# Patient Record
Sex: MALE | Race: OTHER | HISPANIC OR LATINO
[De-identification: names, ages, dates, MRNs, and addresses within clinical notes are randomized per-mention and may not be internally consistent; named-entity substitution may affect disease eponyms.]

---

## 2020-01-17 ENCOUNTER — APPOINTMENT (OUTPATIENT)
Dept: FAMILY MEDICINE | Facility: CLINIC | Age: 40
End: 2020-01-17
Payer: COMMERCIAL

## 2020-01-17 VITALS
HEIGHT: 70 IN | DIASTOLIC BLOOD PRESSURE: 80 MMHG | OXYGEN SATURATION: 97 % | BODY MASS INDEX: 28.35 KG/M2 | RESPIRATION RATE: 14 BRPM | TEMPERATURE: 98.3 F | WEIGHT: 198 LBS | SYSTOLIC BLOOD PRESSURE: 118 MMHG | HEART RATE: 85 BPM

## 2020-01-17 DIAGNOSIS — J45.909 UNSPECIFIED ASTHMA, UNCOMPLICATED: ICD-10-CM

## 2020-01-17 DIAGNOSIS — Z83.3 FAMILY HISTORY OF DIABETES MELLITUS: ICD-10-CM

## 2020-01-17 DIAGNOSIS — Z80.0 FAMILY HISTORY OF MALIGNANT NEOPLASM OF DIGESTIVE ORGANS: ICD-10-CM

## 2020-01-17 DIAGNOSIS — K64.4 RESIDUAL HEMORRHOIDAL SKIN TAGS: ICD-10-CM

## 2020-01-17 DIAGNOSIS — Z82.49 FAMILY HISTORY OF ISCHEMIC HEART DISEASE AND OTHER DISEASES OF THE CIRCULATORY SYSTEM: ICD-10-CM

## 2020-01-17 DIAGNOSIS — Z87.891 PERSONAL HISTORY OF NICOTINE DEPENDENCE: ICD-10-CM

## 2020-01-17 DIAGNOSIS — Z76.89 PERSONS ENCOUNTERING HEALTH SERVICES IN OTHER SPECIFIED CIRCUMSTANCES: ICD-10-CM

## 2020-01-17 DIAGNOSIS — Z78.9 OTHER SPECIFIED HEALTH STATUS: ICD-10-CM

## 2020-01-17 DIAGNOSIS — J30.2 OTHER SEASONAL ALLERGIC RHINITIS: ICD-10-CM

## 2020-01-17 DIAGNOSIS — Z00.00 ENCOUNTER FOR GENERAL ADULT MEDICAL EXAMINATION W/OUT ABNORMAL FINDINGS: ICD-10-CM

## 2020-01-17 DIAGNOSIS — R06.2 WHEEZING: ICD-10-CM

## 2020-01-17 DIAGNOSIS — E34.9 ENDOCRINE DISORDER, UNSPECIFIED: ICD-10-CM

## 2020-01-17 PROCEDURE — 99205 OFFICE O/P NEW HI 60 MIN: CPT

## 2020-01-17 NOTE — HISTORY OF PRESENT ILLNESS
[FreeTextEntry1] : Patient presents to establish new care\par  [de-identified] : c/o b/l knee pain x 3-4 weeks\par R> L\par he takes naproxen at times for pain and he is eating raw tumeric and has some relief when he takes it daily\par c/o  b/l knee   pain, stiffness,   4  sometimes 6-7/10 intermittent but constant mild  , alleviated by naproxen and tumeric and warm compresses  , aggravated by  moving and not sitting down\par \par denies hx ulcers \par \par he saw a rheumatologist 1.5yrs ago and he had blood work done \par he had testosterone injections at that time \par was told to stop marijauana at that time as it could be causing some inflammation and he stopped for a while and restarted again\par he has a hx of using iron infusions as well \par he is not sure who rheumatologist was but he thinks affiliated with [facility] \par \par c/o wheezing denies sob or coughing \par he has a hx of asthma\par

## 2020-01-17 NOTE — HEALTH RISK ASSESSMENT
[Patient reported colonoscopy was normal] : Patient reported colonoscopy was normal [HIV test declined] : HIV test declined [Hepatitis C test declined] : Hepatitis C test declined [Fully functional (bathing, dressing, toileting, transferring, walking, feeding)] : Fully functional (bathing, dressing, toileting, transferring, walking, feeding) [Fully functional (using the telephone, shopping, preparing meals, housekeeping, doing laundry, using] : Fully functional and needs no help or supervision to perform IADLs (using the telephone, shopping, preparing meals, housekeeping, doing laundry, using transportation, managing medications and managing finances) [Smoke Detector] : smoke detector [Carbon Monoxide Detector] : carbon monoxide detector [Seat Belt] :  uses seat belt [Reports changes in hearing] : Reports no changes in hearing [Reports changes in vision] : Reports no changes in vision [Reports changes in dental health] : Reports no changes in dental health [ColonoscopyDate] : 2014 [ColonoscopyComments] : Dr Lang normal except internal hemorrhoid

## 2020-01-17 NOTE — PLAN
[FreeTextEntry1] : b/l knee pain r/o autoimmune as he has lichen planus vs OA vs lymes \par xrays b/l\par naproxen q12hrs prn\par labs to be done fasting\par ortho consult\par rheum consult\par \par \par wheezing at times hx asthma\par pulm for PFTS\par cxr to ensure no other etiology\par albuterol sent to pharmacy\par \par hx lichen planus\par doesn’t want to take po prednisone \par trial clobetasol bid prn\par derm consult\par \par \par anxiety intermittent\par will think about starting ssri to avoid taking xanax as he doesn’t like taking it and doesn’t take it much he says\par labs to be drawn fasting\par advised to stop marijuana as i think it can worsen anxiety he said it doesn’t affect anxiety but can stop at any time\par \par hx iron def\par iron studies, cbc, b12\par \par fu 2-3 weeks pt agreed w/plan

## 2020-01-17 NOTE — REVIEW OF SYSTEMS
[Wheezing] : wheezing [Joint Pain] : joint pain [Skin Rash] : skin rash [Fever] : no fever [Chills] : no chills [Palpitations] : no palpitations [Chest Pain] : no chest pain [FreeTextEntry9] : b/l knee pain [Shortness Of Breath] : no shortness of breath

## 2020-01-17 NOTE — PHYSICAL EXAM
[No Lymphadenopathy] : no lymphadenopathy [Supple] : supple [Normal] : normal rate, regular rhythm, normal S1 and S2 and no murmur heard [No Edema] : there was no peripheral edema [No Focal Deficits] : no focal deficits [Normal Affect] : the affect was normal [Normal Mood] : the mood was normal [Normal Insight/Judgement] : insight and judgment were intact [de-identified] : knees full rom but mild discomfort/stiffness with flexion , +crepitus b/l , no edema or erythema or warmth b/l [de-identified] : multiple purplish papules noted on legs and back looks like old scarring, and small papules with erythema noted on chest x 2 papules

## 2020-01-21 ENCOUNTER — APPOINTMENT (OUTPATIENT)
Dept: FAMILY MEDICINE | Facility: CLINIC | Age: 40
End: 2020-01-21

## 2020-01-22 LAB
ALBUMIN SERPL ELPH-MCNC: 4.6 G/DL
ALP BLD-CCNC: 64 U/L
ALT SERPL-CCNC: 24 U/L
ANION GAP SERPL CALC-SCNC: 12 MMOL/L
AST SERPL-CCNC: 25 U/L
B BURGDOR AB SER-IMP: NEGATIVE
B BURGDOR IGG+IGM SER QL: 0.12 INDEX
BASOPHILS # BLD AUTO: 0.02 K/UL
BASOPHILS NFR BLD AUTO: 0.5 %
BILIRUB SERPL-MCNC: 0.3 MG/DL
BUN SERPL-MCNC: 12 MG/DL
CALCIUM SERPL-MCNC: 9.8 MG/DL
CHLORIDE SERPL-SCNC: 100 MMOL/L
CHOLEST SERPL-MCNC: 206 MG/DL
CHOLEST/HDLC SERPL: 5.5 RATIO
CO2 SERPL-SCNC: 25 MMOL/L
CREAT SERPL-MCNC: 0.84 MG/DL
CRP SERPL-MCNC: <0.1 MG/DL
EOSINOPHIL # BLD AUTO: 0.08 K/UL
EOSINOPHIL NFR BLD AUTO: 1.9 %
ERYTHROCYTE [SEDIMENTATION RATE] IN BLOOD BY WESTERGREN METHOD: 4 MM/HR
ESTIMATED AVERAGE GLUCOSE: 120 MG/DL
FERRITIN SERPL-MCNC: 30 NG/ML
FOLATE SERPL-MCNC: 19.1 NG/ML
GLUCOSE SERPL-MCNC: 93 MG/DL
HBA1C MFR BLD HPLC: 5.8 %
HCT VFR BLD CALC: 45 %
HDLC SERPL-MCNC: 38 MG/DL
HGB BLD-MCNC: 14.7 G/DL
IMM GRANULOCYTES NFR BLD AUTO: 0.2 %
IRON SATN MFR SERPL: 16 %
IRON SERPL-MCNC: 62 UG/DL
LDLC SERPL CALC-MCNC: 125 MG/DL
LYMPHOCYTES # BLD AUTO: 1.6 K/UL
LYMPHOCYTES NFR BLD AUTO: 37.1 %
MAN DIFF?: NORMAL
MCHC RBC-ENTMCNC: 28.8 PG
MCHC RBC-ENTMCNC: 32.7 GM/DL
MCV RBC AUTO: 88.1 FL
MONOCYTES # BLD AUTO: 0.35 K/UL
MONOCYTES NFR BLD AUTO: 8.1 %
NEUTROPHILS # BLD AUTO: 2.25 K/UL
NEUTROPHILS NFR BLD AUTO: 52.2 %
PLATELET # BLD AUTO: 333 K/UL
POTASSIUM SERPL-SCNC: 5 MMOL/L
PROT SERPL-MCNC: 7.4 G/DL
RBC # BLD: 5.11 M/UL
RBC # FLD: 14.4 %
RHEUMATOID FACT SER QL: <10 IU/ML
SODIUM SERPL-SCNC: 137 MMOL/L
TIBC SERPL-MCNC: 378 UG/DL
TRIGL SERPL-MCNC: 218 MG/DL
TSH SERPL-ACNC: 3.74 UIU/ML
UIBC SERPL-MCNC: 316 UG/DL
VIT B12 SERPL-MCNC: 677 PG/ML
WBC # FLD AUTO: 4.31 K/UL

## 2020-01-24 LAB
B BURGDOR AB SER-IMP: NEGATIVE
B BURGDOR IGM PATRN SER IB-IMP: NEGATIVE
B BURGDOR18KD IGG SER QL IB: NORMAL
B BURGDOR23KD IGG SER QL IB: NORMAL
B BURGDOR23KD IGM SER QL IB: NORMAL
B BURGDOR28KD IGG SER QL IB: NORMAL
B BURGDOR30KD IGG SER QL IB: NORMAL
B BURGDOR31KD IGG SER QL IB: NORMAL
B BURGDOR39KD IGG SER QL IB: NORMAL
B BURGDOR39KD IGM SER QL IB: NORMAL
B BURGDOR41KD IGG SER QL IB: NORMAL
B BURGDOR41KD IGM SER QL IB: PRESENT
B BURGDOR45KD IGG SER QL IB: NORMAL
B BURGDOR58KD IGG SER QL IB: NORMAL
B BURGDOR66KD IGG SER QL IB: NORMAL
B BURGDOR93KD IGG SER QL IB: NORMAL
CCP AB SER IA-ACNC: 17 UNITS
DSDNA AB SER-ACNC: <12 IU/ML
ENA SCL70 IGG SER IA-ACNC: <0.2 AL
RF+CCP IGG SER-IMP: NEGATIVE
TESTOST BND SERPL-MCNC: 9.9 PG/ML
TESTOST SERPL-MCNC: 556.5 NG/DL

## 2020-01-30 LAB — ANA SER IF-ACNC: NEGATIVE

## 2020-02-05 ENCOUNTER — APPOINTMENT (OUTPATIENT)
Dept: FAMILY MEDICINE | Facility: CLINIC | Age: 40
End: 2020-02-05
Payer: COMMERCIAL

## 2020-02-05 ENCOUNTER — RESULT CHARGE (OUTPATIENT)
Age: 40
End: 2020-02-05

## 2020-02-05 VITALS
TEMPERATURE: 98.3 F | HEIGHT: 70 IN | BODY MASS INDEX: 28.35 KG/M2 | HEART RATE: 73 BPM | RESPIRATION RATE: 14 BRPM | SYSTOLIC BLOOD PRESSURE: 120 MMHG | OXYGEN SATURATION: 98 % | DIASTOLIC BLOOD PRESSURE: 80 MMHG | WEIGHT: 198 LBS

## 2020-02-05 LAB — S PYO AG SPEC QL IA: NEGATIVE

## 2020-02-05 PROCEDURE — 87880 STREP A ASSAY W/OPTIC: CPT | Mod: QW

## 2020-02-05 PROCEDURE — 99214 OFFICE O/P EST MOD 30 MIN: CPT | Mod: 25

## 2020-02-05 RX ORDER — ALPRAZOLAM 1 MG/1
1 TABLET ORAL DAILY
Refills: 0 | Status: DISCONTINUED | COMMUNITY
Start: 2020-01-17 | End: 2020-02-05

## 2020-02-05 NOTE — HISTORY OF PRESENT ILLNESS
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

## 2020-02-05 NOTE — HISTORY OF PRESENT ILLNESS
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

## 2020-02-05 NOTE — REVIEW OF SYSTEMS
[Sore Throat] : sore throat [Joint Pain] : joint pain [Chills] : no chills [Fever] : no fever [Chest Pain] : no chest pain [Shortness Of Breath] : no shortness of breath [FreeTextEntry9] : knee pain b/l

## 2020-02-05 NOTE — PHYSICAL EXAM
[Supple] : supple [No Lymphadenopathy] : no lymphadenopathy [Normal] : normal rate, regular rhythm, normal S1 and S2 and no murmur heard [No Edema] : there was no peripheral edema [No Focal Deficits] : no focal deficits [Normal Mood] : the mood was normal [Normal Affect] : the affect was normal [Normal Insight/Judgement] : insight and judgment were intact [de-identified] : knees full rom without pain, mild crepitus b/l, mild pain with lateral rotation of right knee no edema or erythema or warmth, mild discomfort on palpation of b/l popliteal fossa R>L and right looks alcantara compared to left

## 2020-02-05 NOTE — PLAN
[FreeTextEntry1] : b/l knee pain r/o autoimmune as he has lichen planus vs OA vs lymes \par xrays b/l wnl\par US r/o popliteal cysts\par seeing ortho monday \par naproxen q12hrs prn\par rheum consult\par \par wheezing at times hx asthma\par pulm for PFTS\par cxr to ensure no other etiology wnl\par albuterol sent to pharmacy last visit \par \par hx lichen planus controlled for now \par doesn’t want to take po prednisone \par trial clobetasol bid prn- found relief \par derm consult\par \par \par anxiety intermittent\par he stopped marijuana\par refilled xanax daily prn\par doesn’t want to start ssri right now\par Reference #: [id number]  istopped \par \par hx iron def\par iron studies, cbc, b12 wnl \par \par Acute pharyngitis \par -Rapid strep neg\par -Throat cx sent\par -Hot tea w/ honey, warm salt water gargles, increase fluids\par \par \par f/u 1 month after seeing specialists  pt agreed w/plan

## 2020-02-05 NOTE — PLAN
[FreeTextEntry1] : b/l knee pain r/o autoimmune as he has lichen planus vs OA vs lymes \par xrays b/l wnl\par US r/o popliteal cysts\par seeing ortho monday \par naproxen q12hrs prn\par rheum consult\par \par wheezing at times hx asthma\par pulm for PFTS\par cxr to ensure no other etiology wnl\par albuterol sent to pharmacy last visit \par \par hx lichen planus controlled for now \par doesn’t want to take po prednisone \par trial clobetasol bid prn- found relief \par derm consult\par \par \par anxiety intermittent\par he stopped marijuana\par refilled xanax daily prn\par doesn’t want to start ssri right now\par Reference #: [id number]  istopped \par \par hx iron def\par iron studies, cbc, b12 wnl \par \par Acute pharyngitis \par -Rapid strep neg\par -Throat cx sent\par -Hot tea w/ honey, warm salt water gargles, increase fluids\par \par \par f/u 1 month after seeing specialists  pt agreed w/plan

## 2020-02-05 NOTE — PHYSICAL EXAM
[No Lymphadenopathy] : no lymphadenopathy [Supple] : supple [No Edema] : there was no peripheral edema [Normal] : normal rate, regular rhythm, normal S1 and S2 and no murmur heard [No Focal Deficits] : no focal deficits [Normal Affect] : the affect was normal [Normal Mood] : the mood was normal [Normal Insight/Judgement] : insight and judgment were intact [de-identified] : knees full rom without pain, mild crepitus b/l, mild pain with lateral rotation of right knee no edema or erythema or warmth, mild discomfort on palpation of b/l popliteal fossa R>L and right looks alcantara compared to left

## 2020-02-08 LAB — BACTERIA THROAT CULT: NORMAL

## 2020-04-30 RX ORDER — ALPRAZOLAM 0.5 MG/1
0.5 TABLET ORAL
Qty: 30 | Refills: 0 | Status: DISCONTINUED | COMMUNITY
Start: 2020-02-05 | End: 2020-04-30

## 2020-05-01 ENCOUNTER — APPOINTMENT (OUTPATIENT)
Dept: FAMILY MEDICINE | Facility: CLINIC | Age: 40
End: 2020-05-01
Payer: COMMERCIAL

## 2020-05-01 VITALS — HEIGHT: 70 IN | WEIGHT: 195 LBS | HEART RATE: 84 BPM | BODY MASS INDEX: 27.92 KG/M2

## 2020-05-01 PROCEDURE — 99212 OFFICE O/P EST SF 10 MIN: CPT | Mod: 95

## 2020-05-01 NOTE — PHYSICAL EXAM
[Normal Sclera/Conjunctiva] : normal sclera/conjunctiva [Normal Outer Ear/Nose] : the outer ears and nose were normal in appearance [Normal Oropharynx] : the oropharynx was normal [No Acute Distress] : no acute distress [Well-Appearing] : well-appearing [Well Nourished] : well nourished [Well Developed] : well developed [EOMI] : extraocular movements intact [No Focal Deficits] : no focal deficits [Coordination Grossly Intact] : coordination grossly intact [Normal Affect] : the affect was normal [Normal Mood] : the mood was normal [Normal Insight/Judgement] : insight and judgment were intact [de-identified] : no swelling in neck as per patient palpating [de-identified] : normal chest rise with inhalation and chest fall with exhalation \par \par

## 2020-05-01 NOTE — PLAN
[FreeTextEntry1] : \par anxiety seems controlled\par refilled xanax uses sparingly - 1month prescription lasted him almost 3 mos\par [facility] Reference #: [id number]\par \par lichen planus- controlled for most part\par refill clobetasol\par \par b/l knee pain\par advised to return to PT once covid crisis is resolved\par for now he has been doing knee stretching at home\par \par f/u as needed pt agreed w/plan

## 2020-05-01 NOTE — HISTORY OF PRESENT ILLNESS
[Other Location: e.g. School (Enter Location, City,State)___] : at [unfilled], at the time of the visit. [Medical Office: ([facility])___] : at the medical office located in  [Patient] : the patient [FreeTextEntry8] : 40yo male presents for follow up for anxiety and lichen planus\par \par he is feeling well overall\par \par he said he slightly  stressed with his job but nothing more than usual, he said he has not had to take xanax everyday\par he is still having pain in his knees but it comes and goes\par he hasn't smoked marijuana and he felt better\par he was doing PT and felt better but had to stop bc of covid19\par he was doing knee stretching at home when PT was cancelled due to covid\par \par

## 2020-06-03 ENCOUNTER — APPOINTMENT (OUTPATIENT)
Dept: FAMILY MEDICINE | Facility: CLINIC | Age: 40
End: 2020-06-03
Payer: COMMERCIAL

## 2020-06-03 PROCEDURE — 99441: CPT

## 2020-06-22 ENCOUNTER — RX RENEWAL (OUTPATIENT)
Age: 40
End: 2020-06-22

## 2020-12-02 ENCOUNTER — APPOINTMENT (OUTPATIENT)
Dept: FAMILY MEDICINE | Facility: CLINIC | Age: 40
End: 2020-12-02
Payer: COMMERCIAL

## 2020-12-02 VITALS
RESPIRATION RATE: 14 BRPM | HEART RATE: 71 BPM | DIASTOLIC BLOOD PRESSURE: 80 MMHG | TEMPERATURE: 98.4 F | WEIGHT: 190 LBS | SYSTOLIC BLOOD PRESSURE: 112 MMHG | BODY MASS INDEX: 27.2 KG/M2 | OXYGEN SATURATION: 98 % | HEIGHT: 70 IN

## 2020-12-02 DIAGNOSIS — Z87.09 PERSONAL HISTORY OF OTHER DISEASES OF THE RESPIRATORY SYSTEM: ICD-10-CM

## 2020-12-02 PROCEDURE — 99213 OFFICE O/P EST LOW 20 MIN: CPT

## 2020-12-02 PROCEDURE — 99072 ADDL SUPL MATRL&STAF TM PHE: CPT

## 2020-12-02 RX ORDER — PREDNISONE 5 MG/1
5 TABLET ORAL EVERY OTHER DAY
Refills: 0 | Status: DISCONTINUED | COMMUNITY
Start: 2020-01-17 | End: 2020-12-02

## 2020-12-02 NOTE — PLAN
[FreeTextEntry1] : \par anxiety intermittent\par contract signed\par urine drug screen today as not on file \par Reference #: [id number]\par refilled xanax but will try hydroxyzine instead and see if it helps\par \par lichen planus\par prednisone taper w/ food\par avoid nsaids\par \par f/u as needed pt agreed w/plan

## 2020-12-02 NOTE — HISTORY OF PRESENT ILLNESS
[FreeTextEntry1] : Patient present for medication renewal\par  [de-identified] : Patient present for medication renewal for lichen planus and anxiety \par he wants to restart a round of prednisone due to a lichen planus flare on his legs b/l \par he said he has itching and he sometimes uses clobetasol cream but it doesn’t do much \par \par he hasn’t had to use prednisone for lichen planus since may\par \par he is using clobetasol once week\par \par he doesn’t take xanax much\par last refill was in may 2020\par he said he only takes it if he feels anxious which isnt often\par he is open to trying something new\par

## 2020-12-02 NOTE — REVIEW OF SYSTEMS
[Fever] : no fever [Chills] : no chills [Chest Pain] : no chest pain [Shortness Of Breath] : no shortness of breath [Cough] : no cough [Itching] : itching [Skin Rash] : skin rash

## 2020-12-02 NOTE — PHYSICAL EXAM
[No Lymphadenopathy] : no lymphadenopathy [Supple] : supple [Normal] : normal rate, regular rhythm, normal S1 and S2 and no murmur heard [No Edema] : there was no peripheral edema [No Focal Deficits] : no focal deficits [Normal Affect] : the affect was normal [Normal Mood] : the mood was normal [Normal Insight/Judgement] : insight and judgment were intact [de-identified] : erythematous small papules noted on legs with some scarring, also on back w/small papules and scarring

## 2020-12-19 LAB — COMPREHENSIVE SCREEN URINE: NORMAL

## 2020-12-23 ENCOUNTER — RX RENEWAL (OUTPATIENT)
Age: 40
End: 2020-12-23

## 2020-12-29 ENCOUNTER — NON-APPOINTMENT (OUTPATIENT)
Age: 40
End: 2020-12-29

## 2021-07-20 ENCOUNTER — APPOINTMENT (OUTPATIENT)
Dept: FAMILY MEDICINE | Facility: CLINIC | Age: 41
End: 2021-07-20
Payer: COMMERCIAL

## 2021-07-20 VITALS
OXYGEN SATURATION: 98 % | HEIGHT: 70 IN | DIASTOLIC BLOOD PRESSURE: 80 MMHG | BODY MASS INDEX: 25.77 KG/M2 | WEIGHT: 180 LBS | HEART RATE: 72 BPM | SYSTOLIC BLOOD PRESSURE: 112 MMHG | RESPIRATION RATE: 14 BRPM

## 2021-07-20 VITALS — TEMPERATURE: 97.9 F

## 2021-07-20 DIAGNOSIS — D50.9 IRON DEFICIENCY ANEMIA, UNSPECIFIED: ICD-10-CM

## 2021-07-20 DIAGNOSIS — Z13.29 ENCOUNTER FOR SCREENING FOR OTHER SUSPECTED ENDOCRINE DISORDER: ICD-10-CM

## 2021-07-20 DIAGNOSIS — Z13.0 ENCOUNTER FOR SCREENING FOR DISEASES OF THE BLOOD AND BLOOD-FORMING ORGANS AND CERTAIN DISORDERS INVOLVING THE IMMUNE MECHANISM: ICD-10-CM

## 2021-07-20 DIAGNOSIS — Z13.220 ENCOUNTER FOR SCREENING FOR LIPOID DISORDERS: ICD-10-CM

## 2021-07-20 DIAGNOSIS — Z12.5 ENCOUNTER FOR SCREENING FOR MALIGNANT NEOPLASM OF PROSTATE: ICD-10-CM

## 2021-07-20 PROCEDURE — G0442 ANNUAL ALCOHOL SCREEN 15 MIN: CPT

## 2021-07-20 PROCEDURE — G0444 DEPRESSION SCREEN ANNUAL: CPT | Mod: 59

## 2021-07-20 PROCEDURE — 99213 OFFICE O/P EST LOW 20 MIN: CPT | Mod: 25

## 2021-07-20 RX ORDER — HYDROXYZINE PAMOATE 25 MG/1
25 CAPSULE ORAL
Qty: 60 | Refills: 2 | Status: DISCONTINUED | COMMUNITY
Start: 2020-12-02 | End: 2021-07-20

## 2021-07-20 NOTE — PLAN
[FreeTextEntry1] : 40 yr old male is here for follow up\par \par continue current medications as directed \par pt does not wish to see rheum at this time. \par \par \par Routine lab work today to screen for anemia, CAD, liver and kidney abnormalities, and thyroid disorders (CBC, CMP, lipid, TSH and PSA) \par \par \par I-STOP: #: [id number] \par Patient advised harmful side effects and risk of dependence with long term use of benzodiazepines.\par Advised caution related to sedative effects and possible respiratory depression. Advised not to take medication while driving  or in combination with alcohol.\par \par continue all medications as directed \par RTO as routine for follow up.\par \par

## 2021-07-20 NOTE — PLAN
[FreeTextEntry1] : 40 yr old male is here for follow up\par \par continue current medications as directed \par pt does not wish to see rheum at this time. \par \par \par Routine lab work today to screen for anemia, CAD, liver and kidney abnormalities, and thyroid disorders (CBC, CMP, lipid, TSH and PSA) \par \par \par I-STOP: #: [id number] \par Patient advised harmful side effects and risk of dependence with long term use of benzodiazepines.\par Advised caution related to sedative effects and possible respiratory depression. Advised not to take medication while driving  or in combination with alcohol.\par \par continue all medications as directed \par RTO as routine for follow up.\par \par

## 2021-07-20 NOTE — END OF VISIT
[FreeTextEntry3] : 20 minutes was spent with patient. Extensive discussion regarding medical compliance with medications, healthy lifestyle and importance of behavioral health.\par  [Time Spent: ___ minutes] : I have spent [unfilled] minutes of time on the encounter.

## 2021-07-20 NOTE — PHYSICAL EXAM
[No Acute Distress] : no acute distress [Normal Sclera/Conjunctiva] : normal sclera/conjunctiva [Normal Outer Ear/Nose] : the outer ears and nose were normal in appearance [No JVD] : no jugular venous distention [No Respiratory Distress] : no respiratory distress  [No Accessory Muscle Use] : no accessory muscle use [Clear to Auscultation] : lungs were clear to auscultation bilaterally [Normal Rate] : normal rate  [Regular Rhythm] : with a regular rhythm [Normal S1, S2] : normal S1 and S2 [No Murmur] : no murmur heard [No Extremity Clubbing/Cyanosis] : no extremity clubbing/cyanosis [Normal Gait] : normal gait [Normal Affect] : the affect was normal [de-identified] : rash on b/l shins

## 2021-07-20 NOTE — PHYSICAL EXAM
[No Acute Distress] : no acute distress [Normal Sclera/Conjunctiva] : normal sclera/conjunctiva [Normal Outer Ear/Nose] : the outer ears and nose were normal in appearance [No JVD] : no jugular venous distention [No Respiratory Distress] : no respiratory distress  [No Accessory Muscle Use] : no accessory muscle use [Clear to Auscultation] : lungs were clear to auscultation bilaterally [Normal Rate] : normal rate  [Regular Rhythm] : with a regular rhythm [Normal S1, S2] : normal S1 and S2 [No Murmur] : no murmur heard [No Extremity Clubbing/Cyanosis] : no extremity clubbing/cyanosis [Normal Gait] : normal gait [Normal Affect] : the affect was normal [de-identified] : rash on b/l shins

## 2021-07-21 ENCOUNTER — APPOINTMENT (OUTPATIENT)
Dept: FAMILY MEDICINE | Facility: CLINIC | Age: 41
End: 2021-07-21
Payer: COMMERCIAL

## 2021-07-21 VITALS — TEMPERATURE: 97.1 F

## 2021-07-21 PROCEDURE — 36415 COLL VENOUS BLD VENIPUNCTURE: CPT

## 2021-07-22 LAB
ALBUMIN SERPL ELPH-MCNC: 4.6 G/DL
ALP BLD-CCNC: 80 U/L
ALT SERPL-CCNC: 19 U/L
ANION GAP SERPL CALC-SCNC: 10 MMOL/L
AST SERPL-CCNC: 22 U/L
BASOPHILS # BLD AUTO: 0.02 K/UL
BASOPHILS NFR BLD AUTO: 0.4 %
BILIRUB SERPL-MCNC: 0.3 MG/DL
BUN SERPL-MCNC: 15 MG/DL
CALCIUM SERPL-MCNC: 9.9 MG/DL
CHLORIDE SERPL-SCNC: 102 MMOL/L
CHOLEST SERPL-MCNC: 206 MG/DL
CO2 SERPL-SCNC: 27 MMOL/L
CREAT SERPL-MCNC: 0.84 MG/DL
EOSINOPHIL # BLD AUTO: 0.13 K/UL
EOSINOPHIL NFR BLD AUTO: 2.6 %
FERRITIN SERPL-MCNC: 45 NG/ML
GLUCOSE SERPL-MCNC: 103 MG/DL
HCT VFR BLD CALC: 45.1 %
HDLC SERPL-MCNC: 47 MG/DL
HGB BLD-MCNC: 14.3 G/DL
IMM GRANULOCYTES NFR BLD AUTO: 0.6 %
IRON SATN MFR SERPL: 14 %
IRON SERPL-MCNC: 54 UG/DL
LDLC SERPL CALC-MCNC: 137 MG/DL
LYMPHOCYTES # BLD AUTO: 1.7 K/UL
LYMPHOCYTES NFR BLD AUTO: 33.5 %
MAN DIFF?: NORMAL
MCHC RBC-ENTMCNC: 29.4 PG
MCHC RBC-ENTMCNC: 31.7 GM/DL
MCV RBC AUTO: 92.6 FL
MONOCYTES # BLD AUTO: 0.38 K/UL
MONOCYTES NFR BLD AUTO: 7.5 %
NEUTROPHILS # BLD AUTO: 2.82 K/UL
NEUTROPHILS NFR BLD AUTO: 55.4 %
NONHDLC SERPL-MCNC: 160 MG/DL
PLATELET # BLD AUTO: 359 K/UL
POTASSIUM SERPL-SCNC: 5.2 MMOL/L
PROT SERPL-MCNC: 7 G/DL
PSA SERPL-MCNC: 0.84 NG/ML
RBC # BLD: 4.87 M/UL
RBC # BLD: 4.87 M/UL
RBC # FLD: 14.2 %
RETICS # AUTO: 1.3 %
RETICS AGGREG/RBC NFR: 64.3 K/UL
SODIUM SERPL-SCNC: 138 MMOL/L
T3FREE SERPL-MCNC: 3.32 PG/ML
T4 FREE SERPL-MCNC: 1.3 NG/DL
TIBC SERPL-MCNC: 377 UG/DL
TRANSFERRIN SERPL-MCNC: 311 MG/DL
TRIGL SERPL-MCNC: 112 MG/DL
TSH SERPL-ACNC: 1.97 UIU/ML
UIBC SERPL-MCNC: 324 UG/DL
WBC # FLD AUTO: 5.08 K/UL

## 2021-07-23 LAB
TESTOST BND SERPL-MCNC: 8.5 PG/ML
TESTOSTERONE TOTAL S: 556 NG/DL

## 2021-07-26 NOTE — HISTORY OF PRESENT ILLNESS
[de-identified] : 40 yr old male is here today for physical examination. He reports taking prednisone due to autoimmune flare ups due to lichen planus. Admits to abdominal discomfort from food. Denies changes in vision, dizziness, chest pain, palpitations and lower extremity edema.\par  [FreeTextEntry1] : /med\par

## 2021-07-26 NOTE — HISTORY OF PRESENT ILLNESS
[de-identified] : 40 yr old male is here today for physical examination. He reports taking prednisone due to autoimmune flare ups due to lichen planus. Admits to abdominal discomfort from food. Denies changes in vision, dizziness, chest pain, palpitations and lower extremity edema.\par  [FreeTextEntry1] : /med\par

## 2021-08-07 ENCOUNTER — RX RENEWAL (OUTPATIENT)
Age: 41
End: 2021-08-07

## 2022-01-24 ENCOUNTER — APPOINTMENT (OUTPATIENT)
Dept: FAMILY MEDICINE | Facility: CLINIC | Age: 42
End: 2022-01-24
Payer: COMMERCIAL

## 2022-01-24 DIAGNOSIS — B96.89 ACUTE SINUSITIS, UNSPECIFIED: ICD-10-CM

## 2022-01-24 DIAGNOSIS — H92.01 OTALGIA, RIGHT EAR: ICD-10-CM

## 2022-01-24 DIAGNOSIS — J01.90 ACUTE SINUSITIS, UNSPECIFIED: ICD-10-CM

## 2022-01-24 PROCEDURE — 99442: CPT

## 2022-01-24 NOTE — REVIEW OF SYSTEMS
[Earache] : earache [Postnasal Drip] : postnasal drip [Nasal Discharge] : nasal discharge [Negative] : Neurological

## 2022-01-24 NOTE — ASSESSMENT
[FreeTextEntry1] : Acute bacterial Sinusitis with Rt Ear Pain\par unable to rule out otitis\par will over for otitis and bacterial sinusitis given hx of double sickening \par amoxicillin 875 bid x 10 days \par advised take with probiotic to prevent diarrhea \par follow up in person if symptoms fail to improve\par \par Lichen Planus\par renewed his prednisone for acute flair that pt says he seems to be developing\par has tolerated this in the past\par patients only during flair\par \par f/u with his PCP as needed or if symptoms fail to improve \par Patient agrees with plan, all further questions answered during encounter.\par

## 2022-01-24 NOTE — HISTORY OF PRESENT ILLNESS
[Home] : at home, [unfilled] , at the time of the visit. [Medical Office: ([facility])___] : at the medical office located in  [Verbal consent obtained from patient] : the patient, [unfilled] [FreeTextEntry8] : 40 yo male, hx of lichen planus presenting for ear pain/sinus symptoms. \par Says his son was given amoxicillin for nasal infection recently. \par patient's Tonsils have white discharge. \par +sore throat. \par Also with right ear soreness. \par Feels sharp pain when swallowing pain.\par Has been occurring for 2 days. \par +nasal congestion. \par +PND\par No fevers or chills. \par No dyspnea, or chest pain. \par Had been having mild uri symptoms for 2 weeks prior to onset of worsening ear pain, sore throat. \par +double sickening\par Tried some tylenol that did not really help much. \par Also requests prednisone refill for recurrent skin rash over lower leg diagnosed previously with lichen planus.

## 2022-01-25 ENCOUNTER — APPOINTMENT (OUTPATIENT)
Dept: FAMILY MEDICINE | Facility: CLINIC | Age: 42
End: 2022-01-25

## 2022-02-16 ENCOUNTER — NON-APPOINTMENT (OUTPATIENT)
Age: 42
End: 2022-02-16

## 2022-10-18 ENCOUNTER — NON-APPOINTMENT (OUTPATIENT)
Age: 42
End: 2022-10-18

## 2022-10-18 ENCOUNTER — APPOINTMENT (OUTPATIENT)
Dept: FAMILY MEDICINE | Facility: CLINIC | Age: 42
End: 2022-10-18

## 2022-10-18 PROCEDURE — 99443: CPT

## 2022-10-18 RX ORDER — CLOBETASOL PROPIONATE 0.5 MG/G
0.05 CREAM TOPICAL TWICE DAILY
Qty: 1 | Refills: 5 | Status: ACTIVE | COMMUNITY
Start: 2020-01-17 | End: 1900-01-01

## 2022-10-18 RX ORDER — METHYLPREDNISOLONE 4 MG/1
4 TABLET ORAL
Qty: 21 | Refills: 0 | Status: DISCONTINUED | COMMUNITY
Start: 2022-04-24

## 2022-10-18 RX ORDER — IBUPROFEN 800 MG/1
800 TABLET, FILM COATED ORAL
Qty: 21 | Refills: 0 | Status: ACTIVE | COMMUNITY
Start: 2022-04-19

## 2022-10-18 RX ORDER — ALBUTEROL SULFATE 90 UG/1
108 (90 BASE) INHALANT RESPIRATORY (INHALATION)
Qty: 1 | Refills: 5 | Status: DISCONTINUED | COMMUNITY
Start: 2020-01-17 | End: 2022-10-18

## 2022-10-18 RX ORDER — CLINDAMYCIN HYDROCHLORIDE 150 MG/1
150 CAPSULE ORAL
Qty: 28 | Refills: 0 | Status: DISCONTINUED | COMMUNITY
Start: 2022-04-19

## 2022-10-18 RX ORDER — CHLORHEXIDINE GLUCONATE, 0.12% ORAL RINSE 1.2 MG/ML
0.12 SOLUTION DENTAL
Qty: 473 | Refills: 0 | Status: DISCONTINUED | COMMUNITY
Start: 2022-04-19

## 2022-10-18 RX ORDER — AMOXICILLIN 875 MG/1
875 TABLET, FILM COATED ORAL
Qty: 20 | Refills: 0 | Status: DISCONTINUED | COMMUNITY
Start: 2022-01-24 | End: 2022-10-18

## 2022-10-18 RX ORDER — AMPICILLIN TRIHYDRATE 500 MG
25 MCG CAPSULE ORAL
Qty: 30 | Refills: 0 | Status: ACTIVE | COMMUNITY
Start: 2022-06-28

## 2022-10-26 ENCOUNTER — APPOINTMENT (OUTPATIENT)
Dept: FAMILY MEDICINE | Facility: CLINIC | Age: 42
End: 2022-10-26

## 2022-10-26 DIAGNOSIS — J02.9 ACUTE PHARYNGITIS, UNSPECIFIED: ICD-10-CM

## 2022-10-26 PROCEDURE — 99441: CPT

## 2022-10-26 RX ORDER — BUPROPION HYDROCHLORIDE 150 MG/1
150 TABLET, EXTENDED RELEASE ORAL DAILY
Qty: 30 | Refills: 0 | Status: DISCONTINUED | COMMUNITY
Start: 2022-10-18 | End: 2022-10-26

## 2022-10-31 DIAGNOSIS — H93.8X3 OTHER SPECIFIED DISORDERS OF EAR, BILATERAL: ICD-10-CM

## 2022-11-02 ENCOUNTER — APPOINTMENT (OUTPATIENT)
Dept: FAMILY MEDICINE | Facility: CLINIC | Age: 42
End: 2022-11-02

## 2022-11-02 VITALS
HEIGHT: 70 IN | RESPIRATION RATE: 14 BRPM | TEMPERATURE: 97.8 F | HEART RATE: 74 BPM | OXYGEN SATURATION: 98 % | SYSTOLIC BLOOD PRESSURE: 126 MMHG | WEIGHT: 185 LBS | BODY MASS INDEX: 26.48 KG/M2 | DIASTOLIC BLOOD PRESSURE: 66 MMHG

## 2022-11-02 DIAGNOSIS — H93.8X9 OTHER SPECIFIED DISORDERS OF EAR, UNSPECIFIED EAR: ICD-10-CM

## 2022-11-02 DIAGNOSIS — R05.9 COUGH, UNSPECIFIED: ICD-10-CM

## 2022-11-02 PROCEDURE — 99213 OFFICE O/P EST LOW 20 MIN: CPT

## 2022-11-02 NOTE — REVIEW OF SYSTEMS
[Fever] : no fever [Chills] : no chills [Earache] : earache [Sore Throat] : no sore throat [Cough] : cough

## 2022-11-02 NOTE — PLAN
[FreeTextEntry1] : \par cough, ear pressure most likely eustachian tube dysfunction\par Flonase and azelastine\par viral swab-r/o covid/viruses\par Continue amoxicillin 500 mg p.o. twice daily with food\par \par f/u 3 days if no relief, pt agreed w/plan\par \par Follow-up in 2 to 3 weeks after starting Lexapro, he will not start until he feels better and completes the course of the medications he is currently on, patient agreed with plan

## 2022-11-02 NOTE — HISTORY OF PRESENT ILLNESS
[FreeTextEntry8] : Patient presents with ear pressure, slight cough and congestion. Symptom started about 10 days ago. \par \par he has a 3 days left on amoxicillin \par denies sore throat\par +feels ear pressure\par denies discharge from ears\par +difficulty hearing\par left side feels more pressure\par he is using flonase x 2 days\par denies fevers, chills\par +cough mild and yellow dark brown sputum denies blood \par \par

## 2022-11-03 LAB
RAPID RVP RESULT: DETECTED
RSV RNA SPEC QL NAA+PROBE: DETECTED
SARS-COV-2 RNA PNL RESP NAA+PROBE: NOT DETECTED

## 2022-11-10 ENCOUNTER — RX RENEWAL (OUTPATIENT)
Age: 42
End: 2022-11-10

## 2022-11-22 ENCOUNTER — RX RENEWAL (OUTPATIENT)
Age: 42
End: 2022-11-22

## 2022-11-29 RX ORDER — AZELASTINE HYDROCHLORIDE 137 UG/1
0.1 SPRAY, METERED NASAL TWICE DAILY
Qty: 1 | Refills: 0 | Status: ACTIVE | COMMUNITY
Start: 2022-11-02 | End: 1900-01-01

## 2023-04-11 ENCOUNTER — RX RENEWAL (OUTPATIENT)
Age: 43
End: 2023-04-11

## 2023-04-11 RX ORDER — FLUTICASONE PROPIONATE 50 UG/1
50 SPRAY, METERED NASAL DAILY
Qty: 16 | Refills: 3 | Status: ACTIVE | COMMUNITY
Start: 2022-10-31 | End: 1900-01-01

## 2023-10-09 ENCOUNTER — NON-APPOINTMENT (OUTPATIENT)
Age: 43
End: 2023-10-09

## 2023-11-01 DIAGNOSIS — K64.8 OTHER HEMORRHOIDS: ICD-10-CM

## 2023-11-02 ENCOUNTER — APPOINTMENT (OUTPATIENT)
Dept: FAMILY MEDICINE | Facility: CLINIC | Age: 43
End: 2023-11-02
Payer: COMMERCIAL

## 2023-11-02 VITALS
HEART RATE: 78 BPM | RESPIRATION RATE: 14 BRPM | HEIGHT: 70 IN | SYSTOLIC BLOOD PRESSURE: 116 MMHG | DIASTOLIC BLOOD PRESSURE: 70 MMHG | OXYGEN SATURATION: 98 % | WEIGHT: 170 LBS | BODY MASS INDEX: 24.34 KG/M2

## 2023-11-02 DIAGNOSIS — K62.89 OTHER SPECIFIED DISEASES OF ANUS AND RECTUM: ICD-10-CM

## 2023-11-02 DIAGNOSIS — R63.4 ABNORMAL WEIGHT LOSS: ICD-10-CM

## 2023-11-02 PROCEDURE — 99214 OFFICE O/P EST MOD 30 MIN: CPT

## 2023-11-02 RX ORDER — FERROUS SULFATE TAB EC 325 MG (65 MG FE EQUIVALENT) 325 (65 FE) MG
325 (65 FE) TABLET DELAYED RESPONSE ORAL
Qty: 90 | Refills: 0 | Status: DISCONTINUED | COMMUNITY
Start: 2022-07-05 | End: 2023-11-02

## 2023-11-02 RX ORDER — AMOXICILLIN 500 MG/1
500 TABLET, FILM COATED ORAL
Qty: 20 | Refills: 0 | Status: DISCONTINUED | COMMUNITY
Start: 2022-10-26 | End: 2023-11-02

## 2023-11-02 RX ORDER — NAPROXEN 500 MG/1
500 TABLET ORAL
Qty: 10 | Refills: 1 | Status: DISCONTINUED | COMMUNITY
Start: 2020-01-17 | End: 2023-11-02

## 2023-11-02 RX ORDER — ESCITALOPRAM OXALATE 5 MG/1
5 TABLET ORAL
Qty: 90 | Refills: 0 | Status: DISCONTINUED | COMMUNITY
Start: 2022-10-26 | End: 2023-11-02

## 2023-12-07 ENCOUNTER — APPOINTMENT (OUTPATIENT)
Dept: FAMILY MEDICINE | Facility: CLINIC | Age: 43
End: 2023-12-07

## 2024-01-23 ENCOUNTER — APPOINTMENT (OUTPATIENT)
Dept: RHEUMATOLOGY | Facility: CLINIC | Age: 44
End: 2024-01-23
Payer: COMMERCIAL

## 2024-01-23 VITALS
OXYGEN SATURATION: 100 % | DIASTOLIC BLOOD PRESSURE: 75 MMHG | TEMPERATURE: 98.1 F | BODY MASS INDEX: 25.54 KG/M2 | HEART RATE: 75 BPM | WEIGHT: 178 LBS | SYSTOLIC BLOOD PRESSURE: 107 MMHG

## 2024-01-23 DIAGNOSIS — M25.561 PAIN IN RIGHT KNEE: ICD-10-CM

## 2024-01-23 DIAGNOSIS — M79.10 MYALGIA, UNSPECIFIED SITE: ICD-10-CM

## 2024-01-23 DIAGNOSIS — M25.562 PAIN IN RIGHT KNEE: ICD-10-CM

## 2024-01-23 PROCEDURE — 99204 OFFICE O/P NEW MOD 45 MIN: CPT

## 2024-01-23 NOTE — HISTORY OF PRESENT ILLNESS
[FreeTextEntry1] : 44 y/o male w/ iron deficiency referred to rheumatology for lichen planus.  Pt follows with [place] for iron deficiency anemia.  Pt was diagnosed with lichen planus of mouth, arms, legs. Pt has had biopsies by dermatologist in the past showing lichen planus. Pt was given bursts of steroids, offered injections with general improvement during the PDN bursts, but still has occasional significant bursts. Pt was last seen by dermatology ~6 months ago. Pt reports pains in the R knee, R elbow, b/l knuckles. Pt takes naproxen PRN rarely PRN. Pt does landscape design.  WORKUP:  Normal/neg (1/2023 - 11/2023): CBC, CMP, Mg, Phos, ESR, CRP, Celiac disease Abs and HLA , uric acid 4.6, TSH, ferritin, iron panel, B12, folate

## 2024-01-23 NOTE — ASSESSMENT
[FreeTextEntry1] : 44 y/o male w/ iron deficiency referred to rheumatology for lichen planus.  [facility].  Pt was diagnosed with lichen planus of mouth, arms, legs. Pt has had biopsies by dermatologist in the past showing lichen planus. Pt was given bursts of steroids, offered injections with general improvement during the PDN bursts, but still has occasional significant bursts. Pt was last seen by dermatology ~6 months ago. Pt reports pains in the R knee, R elbow, b/l knuckles. Pt takes naproxen PRN rarely PRN. Pt does landscape design.  Pt has lichen planus diagnosed for several years. Pt has been referred to rheumatology by hem/onc for evaluation of lichen planus or other autoimmune diseeases. The etiology of lichen planus is unclear but possibly autoimmune, but it has not been associated with any systemic autoimmune diseases. Only clear association with lichen planus is hepatitis C (previously negative per pt). Treatment of lichen planus is by dermatology. Pt's joint pains are non-inflammatory and likely related to his active work. Although I have low suspicion for inflammatory arthritis causing his pains, I can evaluate for signs of underlying systemic autoimmune disease if pt and/or hematologist feel strongly about workup. However, as previously stated, lichen planus has not been associated with systemic autoimmune disease and I do not have higher suspicion for systemic autoimmune diseases than another person without lichen planus with similar symptoms. If dermatology discuss immunosuppressant medications for lichen planus and would like rheum help in managing the medication, I can assist as well.  - Ordered labwork for evaluation for systemic autoimmune diseases. - Will contact pt with results of above workup. If unremarkable, pt does not need to follow up regularly with rheumatology. RTC PRN.  THE DURATION OF THE CLINIC VISIT INCLUDING REVIEW OF PRIOR RECORDS WAS 45 MINUTES

## 2024-06-11 ENCOUNTER — APPOINTMENT (OUTPATIENT)
Dept: FAMILY MEDICINE | Facility: CLINIC | Age: 44
End: 2024-06-11
Payer: COMMERCIAL

## 2024-06-11 DIAGNOSIS — F41.9 ANXIETY DISORDER, UNSPECIFIED: ICD-10-CM

## 2024-06-11 DIAGNOSIS — L43.9 LICHEN PLANUS, UNSPECIFIED: ICD-10-CM

## 2024-06-11 PROCEDURE — G2211 COMPLEX E/M VISIT ADD ON: CPT

## 2024-06-11 PROCEDURE — 99214 OFFICE O/P EST MOD 30 MIN: CPT

## 2024-06-11 RX ORDER — CARISOPRODOL 250 MG/1
250 TABLET ORAL
Qty: 7 | Refills: 0 | Status: DISCONTINUED | COMMUNITY
Start: 2023-11-02 | End: 2024-06-11

## 2024-06-11 RX ORDER — PREDNISONE 10 MG/1
10 TABLET ORAL
Qty: 53 | Refills: 0 | Status: ACTIVE | COMMUNITY
Start: 2020-06-03 | End: 1900-01-01

## 2024-06-11 RX ORDER — CYCLOBENZAPRINE HYDROCHLORIDE 5 MG/1
5 TABLET, FILM COATED ORAL
Qty: 20 | Refills: 0 | Status: DISCONTINUED | COMMUNITY
Start: 2023-11-03 | End: 2024-06-11

## 2024-06-11 NOTE — HISTORY OF PRESENT ILLNESS
[Home] : at home, [unfilled] , at the time of the visit. [Medical Office: ([facility])___] : at the medical office located in  [Verbal consent obtained from patient] : the patient, [unfilled] [FreeTextEntry1] : 44yo M presents for f/u f/u for anxiety [de-identified] : 44yo M presents for f/u f/u for anxiety  he is feeling well  he is having a flare up with lichen planus

## 2024-06-11 NOTE — PLAN
[FreeTextEntry1] :  anxiety refilled xanax 0.5mg po daily prn Reference #: [id number]  lichen planus flare prednisone taper sent to pharmacy  take prednisone 40mg po x 5 days then  30mgs x 5 days then 20mg x 5 days then 10mg x 5 days then 5mg x 5 days then stop  take with food  advised to utilize patient portal  f/u if no relief pt agreed w/plan  f/u 1 month or sooner if losing more weight, or issues arise pt agreed w/plan.

## 2024-06-11 NOTE — PHYSICAL EXAM
[Normal] : no acute distress, well nourished, well developed and well-appearing [EOMI] : extraocular movements intact

## 2024-06-12 RX ORDER — ALPRAZOLAM 0.5 MG/1
0.5 TABLET ORAL DAILY
Qty: 30 | Refills: 0 | Status: ACTIVE | COMMUNITY
Start: 2020-04-30 | End: 1900-01-01

## 2024-09-13 ENCOUNTER — APPOINTMENT (OUTPATIENT)
Dept: FAMILY MEDICINE | Facility: CLINIC | Age: 44
End: 2024-09-13
Payer: COMMERCIAL

## 2024-09-13 VITALS
TEMPERATURE: 98.3 F | SYSTOLIC BLOOD PRESSURE: 110 MMHG | DIASTOLIC BLOOD PRESSURE: 70 MMHG | HEIGHT: 70 IN | BODY MASS INDEX: 26.05 KG/M2 | OXYGEN SATURATION: 98 % | RESPIRATION RATE: 14 BRPM | HEART RATE: 72 BPM | WEIGHT: 182 LBS

## 2024-09-13 DIAGNOSIS — Z91.018 ALLERGY TO OTHER FOODS: ICD-10-CM

## 2024-09-13 DIAGNOSIS — R10.9 UNSPECIFIED ABDOMINAL PAIN: ICD-10-CM

## 2024-09-13 DIAGNOSIS — M25.50 PAIN IN UNSPECIFIED JOINT: ICD-10-CM

## 2024-09-13 DIAGNOSIS — L43.9 LICHEN PLANUS, UNSPECIFIED: ICD-10-CM

## 2024-09-13 PROCEDURE — 99214 OFFICE O/P EST MOD 30 MIN: CPT

## 2024-09-13 PROCEDURE — G2211 COMPLEX E/M VISIT ADD ON: CPT

## 2024-09-16 PROBLEM — Z91.018 FOOD ALLERGY: Status: ACTIVE | Noted: 2024-09-13

## 2024-09-16 NOTE — PLAN
[FreeTextEntry1] : he wants to find out what foods are causing inflammation in his body r/o food allergies vs celiac vs autoimmune diseases   advised to speak to heme/onc about mthfr he will f/u with gi also r/o celiac he might cut out gluten for 2 weeks and see how he feels  cpe labs lipid   advised if patient doesnt hear from me 1 week after testing to call office for results , patient agreed w/plan and understood.

## 2024-09-16 NOTE — HISTORY OF PRESENT ILLNESS
[FreeTextEntry1] : Follow up for bw and med check.  [de-identified] : 42YO M  Presents for f/u for possible food allergies and r/o autoimmune diseases. He said he wants to know if certain foods affect his body. He wants to be tested for MTHFR mutation he hasn't had a flare recently of lichen planus

## 2024-09-16 NOTE — HISTORY OF PRESENT ILLNESS
[FreeTextEntry1] : Follow up for bw and med check.  [de-identified] : 44YO M  Presents for f/u for possible food allergies and r/o autoimmune diseases. He said he wants to know if certain foods affect his body. He wants to be tested for MTHFR mutation he hasn't had a flare recently of lichen planus  289.9

## 2024-09-16 NOTE — PHYSICAL EXAM
[No Lymphadenopathy] : no lymphadenopathy [Supple] : supple [Normal] : normal rate, regular rhythm, normal S1 and S2 and no murmur heard [No Edema] : there was no peripheral edema [No Focal Deficits] : no focal deficits [Normal Affect] : the affect was normal [Normal Mood] : the mood was normal [Normal Insight/Judgement] : insight and judgment were intact [de-identified] : no calf tenderness b/l LE

## 2024-09-16 NOTE — PHYSICAL EXAM
[No Lymphadenopathy] : no lymphadenopathy [Supple] : supple [Normal] : normal rate, regular rhythm, normal S1 and S2 and no murmur heard [No Edema] : there was no peripheral edema [No Focal Deficits] : no focal deficits [Normal Affect] : the affect was normal [Normal Mood] : the mood was normal [Normal Insight/Judgement] : insight and judgment were intact [de-identified] : no calf tenderness b/l LE

## 2024-10-23 DIAGNOSIS — K21.9 GASTRO-ESOPHAGEAL REFLUX DISEASE W/OUT ESOPHAGITIS: ICD-10-CM

## 2024-10-23 RX ORDER — PANTOPRAZOLE 40 MG/1
40 TABLET, DELAYED RELEASE ORAL
Qty: 90 | Refills: 1 | Status: ACTIVE | COMMUNITY
Start: 2024-10-23 | End: 1900-01-01

## 2024-12-05 ENCOUNTER — APPOINTMENT (OUTPATIENT)
Dept: FAMILY MEDICINE | Facility: CLINIC | Age: 44
End: 2024-12-05
Payer: COMMERCIAL

## 2024-12-05 VITALS
OXYGEN SATURATION: 98 % | SYSTOLIC BLOOD PRESSURE: 112 MMHG | HEART RATE: 68 BPM | DIASTOLIC BLOOD PRESSURE: 70 MMHG | HEIGHT: 70 IN | WEIGHT: 178 LBS | RESPIRATION RATE: 12 BRPM | BODY MASS INDEX: 25.48 KG/M2

## 2024-12-05 DIAGNOSIS — M25.511 PAIN IN RIGHT SHOULDER: ICD-10-CM

## 2024-12-05 PROCEDURE — 99214 OFFICE O/P EST MOD 30 MIN: CPT

## 2024-12-09 RX ORDER — HYDROMORPHONE HYDROCHLORIDE 4 MG/1
4 TABLET ORAL
Qty: 28 | Refills: 0 | Status: ACTIVE | COMMUNITY
Start: 2024-12-05 | End: 1900-01-01

## 2025-01-24 ENCOUNTER — APPOINTMENT (OUTPATIENT)
Dept: FAMILY MEDICINE | Facility: CLINIC | Age: 45
End: 2025-01-24
Payer: MEDICAID

## 2025-01-24 VITALS
DIASTOLIC BLOOD PRESSURE: 60 MMHG | HEART RATE: 75 BPM | TEMPERATURE: 98.7 F | SYSTOLIC BLOOD PRESSURE: 110 MMHG | BODY MASS INDEX: 25.8 KG/M2 | HEIGHT: 70 IN | OXYGEN SATURATION: 98 % | WEIGHT: 180.25 LBS

## 2025-01-24 DIAGNOSIS — J02.9 ACUTE PHARYNGITIS, UNSPECIFIED: ICD-10-CM

## 2025-01-24 PROCEDURE — G2211 COMPLEX E/M VISIT ADD ON: CPT | Mod: NC

## 2025-01-24 PROCEDURE — 99213 OFFICE O/P EST LOW 20 MIN: CPT

## 2025-01-24 RX ORDER — AMOXICILLIN 500 MG/1
500 TABLET, FILM COATED ORAL
Qty: 20 | Refills: 0 | Status: ACTIVE | COMMUNITY
Start: 2025-01-24 | End: 1900-01-01

## 2025-01-27 LAB — S PYO DNA THROAT QL NAA+PROBE: NOT DETECTED

## 2025-02-12 ENCOUNTER — APPOINTMENT (OUTPATIENT)
Dept: FAMILY MEDICINE | Facility: CLINIC | Age: 45
End: 2025-02-12
Payer: MEDICAID

## 2025-02-12 VITALS
OXYGEN SATURATION: 98 % | BODY MASS INDEX: 25.48 KG/M2 | HEIGHT: 70 IN | HEART RATE: 75 BPM | SYSTOLIC BLOOD PRESSURE: 110 MMHG | WEIGHT: 178 LBS | RESPIRATION RATE: 12 BRPM | DIASTOLIC BLOOD PRESSURE: 62 MMHG

## 2025-02-12 DIAGNOSIS — R22.1 LOCALIZED SWELLING, MASS AND LUMP, NECK: ICD-10-CM

## 2025-02-12 DIAGNOSIS — M25.50 PAIN IN UNSPECIFIED JOINT: ICD-10-CM

## 2025-02-12 DIAGNOSIS — Z91.89 OTHER SPECIFIED PERSONAL RISK FACTORS, NOT ELSEWHERE CLASSIFIED: ICD-10-CM

## 2025-02-12 DIAGNOSIS — Z00.00 ENCOUNTER FOR GENERAL ADULT MEDICAL EXAMINATION W/OUT ABNORMAL FINDINGS: ICD-10-CM

## 2025-02-12 PROCEDURE — 99396 PREV VISIT EST AGE 40-64: CPT

## 2025-02-12 RX ORDER — TRAMADOL HYDROCHLORIDE 50 MG/1
50 TABLET, COATED ORAL TWICE DAILY
Qty: 14 | Refills: 0 | Status: ACTIVE | COMMUNITY
Start: 2025-02-12 | End: 1900-01-01

## 2025-03-26 ENCOUNTER — APPOINTMENT (OUTPATIENT)
Dept: FAMILY MEDICINE | Facility: CLINIC | Age: 45
End: 2025-03-26
Payer: MEDICAID

## 2025-03-26 VITALS
OXYGEN SATURATION: 96 % | DIASTOLIC BLOOD PRESSURE: 66 MMHG | SYSTOLIC BLOOD PRESSURE: 120 MMHG | RESPIRATION RATE: 12 BRPM | HEART RATE: 93 BPM | HEIGHT: 70 IN | BODY MASS INDEX: 25.05 KG/M2 | WEIGHT: 175 LBS | TEMPERATURE: 98.5 F

## 2025-03-26 DIAGNOSIS — R76.8 OTHER SPECIFIED ABNORMAL IMMUNOLOGICAL FINDINGS IN SERUM: ICD-10-CM

## 2025-03-26 DIAGNOSIS — L43.9 LICHEN PLANUS, UNSPECIFIED: ICD-10-CM

## 2025-03-26 DIAGNOSIS — M79.10 MYALGIA, UNSPECIFIED SITE: ICD-10-CM

## 2025-03-26 DIAGNOSIS — E78.5 HYPERLIPIDEMIA, UNSPECIFIED: ICD-10-CM

## 2025-03-26 DIAGNOSIS — M25.50 PAIN IN UNSPECIFIED JOINT: ICD-10-CM

## 2025-03-26 LAB
AMP / AMPHETAMINE: NEGATIVE
BAR / SECOBARBITAL: NEGATIVE
BUP / BUPRENORPHINE: NEGATIVE
BZO / OXAZEPAM: NEGATIVE
COC / COCAINE: NEGATIVE
MDMA / METHYLENEDIOXYMETHAMPHETAMINE: NEGATIVE
MET / METHAMPHETAMINES: NEGATIVE
MOP / MORPHINE: NEGATIVE
MTD / METHADONE: NEGATIVE
OXY / OXYCODONE: NEGATIVE
PCP / PHENCYCLIDINE: NEGATIVE
THC / MARIJUANA: POSITIVE

## 2025-03-26 PROCEDURE — 80305 DRUG TEST PRSMV DIR OPT OBS: CPT | Mod: QW

## 2025-03-26 PROCEDURE — 99214 OFFICE O/P EST MOD 30 MIN: CPT | Mod: 25

## 2025-04-04 ENCOUNTER — APPOINTMENT (OUTPATIENT)
Dept: FAMILY MEDICINE | Facility: CLINIC | Age: 45
End: 2025-04-04

## 2025-04-21 ENCOUNTER — APPOINTMENT (OUTPATIENT)
Dept: FAMILY MEDICINE | Facility: CLINIC | Age: 45
End: 2025-04-21
Payer: MEDICAID

## 2025-04-21 ENCOUNTER — LABORATORY RESULT (OUTPATIENT)
Age: 45
End: 2025-04-21

## 2025-04-21 DIAGNOSIS — E78.5 HYPERLIPIDEMIA, UNSPECIFIED: ICD-10-CM

## 2025-04-21 PROCEDURE — ZZZZZ: CPT

## 2025-06-04 ENCOUNTER — APPOINTMENT (OUTPATIENT)
Dept: FAMILY MEDICINE | Facility: CLINIC | Age: 45
End: 2025-06-04

## 2025-06-04 ENCOUNTER — NON-APPOINTMENT (OUTPATIENT)
Age: 45
End: 2025-06-04

## 2025-06-04 VITALS
SYSTOLIC BLOOD PRESSURE: 118 MMHG | HEART RATE: 69 BPM | BODY MASS INDEX: 25.2 KG/M2 | WEIGHT: 176 LBS | OXYGEN SATURATION: 97 % | DIASTOLIC BLOOD PRESSURE: 78 MMHG | RESPIRATION RATE: 12 BRPM | HEIGHT: 70 IN

## 2025-06-04 DIAGNOSIS — R07.89 OTHER CHEST PAIN: ICD-10-CM

## 2025-06-04 DIAGNOSIS — R19.8 OTHER SPECIFIED SYMPTOMS AND SIGNS INVOLVING THE DIGESTIVE SYSTEM AND ABDOMEN: ICD-10-CM

## 2025-06-04 PROCEDURE — 99214 OFFICE O/P EST MOD 30 MIN: CPT | Mod: 25

## 2025-06-04 PROCEDURE — 93000 ELECTROCARDIOGRAM COMPLETE: CPT

## 2025-06-11 ENCOUNTER — NON-APPOINTMENT (OUTPATIENT)
Age: 45
End: 2025-06-11

## 2025-07-09 ENCOUNTER — APPOINTMENT (OUTPATIENT)
Facility: CLINIC | Age: 45
End: 2025-07-09
Payer: MEDICAID

## 2025-07-09 VITALS
DIASTOLIC BLOOD PRESSURE: 78 MMHG | OXYGEN SATURATION: 98 % | SYSTOLIC BLOOD PRESSURE: 128 MMHG | HEART RATE: 60 BPM | HEIGHT: 70 IN | BODY MASS INDEX: 25.05 KG/M2 | WEIGHT: 175 LBS

## 2025-07-09 PROBLEM — R94.31 ABNORMAL EKG: Status: ACTIVE | Noted: 2025-07-09

## 2025-07-09 PROBLEM — I45.10 INCOMPLETE RBBB: Status: ACTIVE | Noted: 2025-07-09

## 2025-07-09 PROBLEM — R07.89 CHEST DISCOMFORT: Status: ACTIVE | Noted: 2025-07-09

## 2025-07-09 PROBLEM — F12.90 MARIJUANA SMOKER: Status: ACTIVE | Noted: 2025-07-09

## 2025-07-09 PROBLEM — Z87.891 FORMER SMOKER: Status: ACTIVE | Noted: 2025-07-09

## 2025-07-09 PROCEDURE — 93000 ELECTROCARDIOGRAM COMPLETE: CPT

## 2025-07-09 PROCEDURE — 99204 OFFICE O/P NEW MOD 45 MIN: CPT | Mod: 25

## 2025-07-25 ENCOUNTER — APPOINTMENT (OUTPATIENT)
Dept: FAMILY MEDICINE | Facility: CLINIC | Age: 45
End: 2025-07-25
Payer: MEDICAID

## 2025-07-25 VITALS
DIASTOLIC BLOOD PRESSURE: 80 MMHG | HEIGHT: 70 IN | OXYGEN SATURATION: 97 % | RESPIRATION RATE: 12 BRPM | HEART RATE: 77 BPM | SYSTOLIC BLOOD PRESSURE: 100 MMHG | BODY MASS INDEX: 25.77 KG/M2 | TEMPERATURE: 97.6 F | WEIGHT: 180 LBS

## 2025-07-25 DIAGNOSIS — R22.1 LOCALIZED SWELLING, MASS AND LUMP, NECK: ICD-10-CM

## 2025-07-25 DIAGNOSIS — F41.9 ANXIETY DISORDER, UNSPECIFIED: ICD-10-CM

## 2025-07-25 DIAGNOSIS — M25.50 PAIN IN UNSPECIFIED JOINT: ICD-10-CM

## 2025-07-25 DIAGNOSIS — L43.9 LICHEN PLANUS, UNSPECIFIED: ICD-10-CM

## 2025-07-25 DIAGNOSIS — R23.8 OTHER SKIN CHANGES: ICD-10-CM

## 2025-07-25 DIAGNOSIS — Z76.0 ENCOUNTER FOR ISSUE OF REPEAT PRESCRIPTION: ICD-10-CM

## 2025-07-25 DIAGNOSIS — R59.1 GENERALIZED ENLARGED LYMPH NODES: ICD-10-CM

## 2025-07-25 PROCEDURE — 99214 OFFICE O/P EST MOD 30 MIN: CPT

## 2025-07-25 PROCEDURE — G2211 COMPLEX E/M VISIT ADD ON: CPT | Mod: NC

## 2025-07-25 RX ORDER — CLOBETASOL PROPIONATE CREAM USP, 0.05% 0.5 MG/G
0.05 CREAM TOPICAL TWICE DAILY
Qty: 1 | Refills: 1 | Status: ACTIVE | COMMUNITY
Start: 2025-07-25 | End: 1900-01-01

## 2025-07-25 RX ORDER — MUPIROCIN 20 MG/G
2 OINTMENT TOPICAL 3 TIMES DAILY
Qty: 1 | Refills: 0 | Status: ACTIVE | COMMUNITY
Start: 2025-07-25 | End: 1900-01-01

## 2025-07-28 ENCOUNTER — APPOINTMENT (OUTPATIENT)
Dept: CARDIOLOGY | Facility: CLINIC | Age: 45
End: 2025-07-28
Payer: MEDICAID

## 2025-07-28 PROCEDURE — 93306 TTE W/DOPPLER COMPLETE: CPT
